# Patient Record
(demographics unavailable — no encounter records)

---

## 2018-01-07 NOTE — PHYS DOC
General


Chief Complaint:  URINARY RETENTION


Stated Complaint:  UNABLE TO URINATE


Time Seen by MD:  15:57


Source:  patient


Exam Limitations:  no limitations


Problems:  





History of Present Illness


Initial Comments


Patient is a 67-year-old male who comes the ED complaining of urinary retention.


Patient states he has history of kidney stones and "bad right kidney." He 

states that for the past 3 days she's had increasing difficulty producing a 

urine stream and that today he's had trouble producing anything but just a few 

drops since earlier this morning. He has been extensively evaluated in the past 

and states that he's been told he has blockages in his right ureter which were 

inoperable and that he had 95% deficit associated with his right kidney. On 

arrival he is very uncomfortable complaining of suprapubic and penile pain 

described as sharp and throbbing worse with attempts to urinate better with 

rest. He denies fever chills sweats or body aches no nausea vomiting or 

diarrhea.


Timing/Duration:  other


Severity:  severe


Modifying Factors:  improves with other


Associated Symptoms:  other


Allergies:  


Coded Allergies:  


     No Known Drug Allergies (Unverified , 16)





Past Medical History


Medical History:  other (arthritis, cancer, diabetes, kidney stones, ureteral 

stricture)


Surgical History:  appendectomy, cholecystectomy





Social History


Smoker:  cigarettes


Alcohol:  none


Drugs:  none





Review of Systems


Constitutional:  denies chills, denies fever, denies malaise


Respiratory:  denies cough, denies shortness of breath


Cardiovascular:  denies chest pain, denies palpitations


Gastrointestinal:  denies abdominal pain, denies diarrhea, denies nausea, 

denies vomiting


Genitourinary:  see HPI


Musculoskeletal:  denies back pain, denies joint swelling, denies neck pain


Psychiatric/Neurological:  denies headache, denies numbness, denies paresthesia





Physical Exam


General Appearance:  WD/WN, moderate distress


Ear, Nose, Throat:  hearing grossly normal, normal ENT inspection


Neck:  non-tender, supple


Respiratory:  normal breath sounds, no respiratory distress


Cardiovascular:  normal peripheral pulses, regular rate, rhythm


Gastrointestinal:  normal bowel sounds, soft (initially suprapubic tenderness 

with fullness), no organomegaly


Rectal:  deferred


Back:  no CVA tenderness, no vertebral tenderness


Extremities:  non-tender, normal inspection


Neurologic/Psychiatric:  CNs II-XII nml as tested, no motor/sensory deficits, 

alert, normal mood/affect





Orders, Labs, Meds


400 mL urine output after RN placed a Gaxiola


Labs reassuring, creatinine 1.4. Patient continues with waves of discomfort, CT 

indicated and ordered.














PATIENT: MAKAYLA DEL ROSARIO ACCOUNT: [id number] MRN#: [id number]


: 1950 LOCATION: ER AGE: 67


SEX: M EXAM DT: 18 ACCESSION#: [id number]


STATUS: REG ER ORD. PHYSICIAN: LUCINDA PEREZ DO 


REASON: urinary retention, groin/penile pain, h/o stones


PROCEDURE: CT ABDOMEN PELVIS WO CONTRAST





CT abdomen and pelvis without contrast


 


Indication:Urinary retention, severe penis and bladder pain x several 


days.  rt testicle cancer with radiation treatment,.


 


Technique: Contiguous axial images are obtained through the abdomen and 


pelvis. No intravenous or oral contrast per request. Multiplanar 


reformatted images obtained.


 


Exposure: One or more of the following individualized dose reduction 


techniques were utilized for this examination:  1. Automated exposure 


control  2. Adjustment of the mA and/or kV according to patient size  3. 


Use of iterative reconstruction technique.


 


Comparison: May 13, 2016.


 


Findings:


Urinary tracts: Severe right hydronephrosis and right hydroureter is 


identified. This was severe on the prior study, but has still 


substantially progressed since that last study. Severe renal cortical 


atrophy. The right ureter gradually tapers distally to the ureterovesical 


junction. No evidence of obstructive calculus.


 


Minimal left nephrolithiasis. No evidence of left hydronephroureter or 


ureteric calculus.


 


Evaluation of solid viscera, bowel and vasculature is compromised by the 


noncontrast technique.   


 


Lower thorax: Mild atelectasis or scarring in the right lung base.


Pneumoperitoneum:No gross pneumoperitoneum.


Liver: Unremarkable


Spleen: Unremarkable


Pancreas: Unremarkable


Kidneys: Small hypodense lesions of the left kidney compatible with cysts,


are similar to prior. There may be some right renal cysts, but these are 


difficult to distinguish from the severely dilated collecting system.


Adrenals: The left adrenal again demonstrates a somewhat thickened 


morphology, appears unchanged.


 


Gallbladder: Surgically absent.


Aorta: Abdominal aorta is nonaneurysmal


Lymph nodes: Small mildly enlarged aortocaval lymph nodes are identified, 


measuring up to 8 mm short axis.


 


GI tract: Mild diverticulosis. No evidence of pericolonic inflammatory 


changes. No evidence of obstruction.


Appendix: Not clearly visualized.


 


Ascites: No gross ascites.


Urinary bladder: Collapsed around a Gaxiola catheter.


No evidence of pelvic mass.


 


Bones: Severe degenerative changes. Appearance and alignment are stable 


since prior study. Mild retrolisthesis of L4 on L5 and L3 on L4.


 


IMPRESSION:


 


1. Severe right hydronephrosis and ureteric dilatation, has progressed 


since the prior study. Ureter tapers toward the ureterovesical junction, 


could indicate a stricture. No evidence of obstructive calculus. Severe 


renal cortical atrophy.


2. Mild retroperitoneal lymph node enlargement appears similar to prior 


study.


 


Electronically signed by: Venu Nevarez MD (2018 5:19 PM) [facility]














DICTATED AND SIGNED BY:     VENU NEVAREZ MD


DATE:     18 [id number]





CC: WEN HOLLOWAY MD; LUCINDA PEREZ DO ~

















I discussed findings with the patient and his spouse at length. I discussed and 

offered the patient transfer to [facility] for urologic 

evaluation. The patient declines he feels that no emergent situation is present 

that he has had these symptoms in the past and wants to go home. He wants to 

try and see if he can urinate now that he's had a Gaxiola catheter he refuses 

medications as he states he has Flomax pain medications etc. at home. He agrees 

to return to [facility] emergency department should symptoms 

recur as he wants to go wherever urology is in house. Signs and symptoms to 

monitor as well as indications for urgent return to the department were 

discussed in their questions were answered. The patient is persistent and his 

declination of admission or transfer and although I feel he would be better 

served as an inpatient he is alert and oriented and is not altered on any 

substance, his wishes will be honored he will be discharged home. He requests 

the Gaxiola be DC'd.


Departure


Time of Disposition:  17:41


Disposition:  01 HOME, SELF-CARE


Diagnosis:  urinary retention, right ureteral stricture


Condition:  STABLE


Patient Instructions:  Urinary Retention, Acute, Male, Easy-to-Read





Additional Instructions:  


As discussed you have declined transfer to a facility with in-house urology and 

have requested discharge home.


Continue your current medications and including Flomax and pain medications as 

needed.


Per your request: 


[facility], 


[address] [place] [address] [id number] [phone]


Call tomorrow to schedule next available appointment for further evaluation and 

treatment.


Return to the ED at any time should you change your mind or with new or 

progressive symptoms.











LUCINDA PEREZ DO 2018 17:27

## 2018-01-07 NOTE — RAD
CT abdomen and pelvis without contrast

 

Indication:Urinary retention, severe penis and bladder pain x several 

days. 1995 rt testicle cancer with radiation treatment,.

 

Technique: Contiguous axial images are obtained through the abdomen and 

pelvis. No intravenous or oral contrast per request. Multiplanar 

reformatted images obtained.

 

Exposure: One or more of the following individualized dose reduction 

techniques were utilized for this examination:  1. Automated exposure 

control  2. Adjustment of the mA and/or kV according to patient size  3. 

Use of iterative reconstruction technique.

 

Comparison: May 13, 2016.

 

Findings:

Urinary tracts: Severe right hydronephrosis and right hydroureter is 

identified. This was severe on the prior study, but has still 

substantially progressed since that last study. Severe renal cortical 

atrophy. The right ureter gradually tapers distally to the ureterovesical 

junction. No evidence of obstructive calculus.

 

Minimal left nephrolithiasis. No evidence of left hydronephroureter or 

ureteric calculus.

 

Evaluation of solid viscera, bowel and vasculature is compromised by the 

noncontrast technique.   

 

Lower thorax: Mild atelectasis or scarring in the right lung base.

Pneumoperitoneum:No gross pneumoperitoneum.

Liver: Unremarkable

Spleen: Unremarkable

Pancreas: Unremarkable

Kidneys: Small hypodense lesions of the left kidney compatible with cysts,

are similar to prior. There may be some right renal cysts, but these are 

difficult to distinguish from the severely dilated collecting system.

Adrenals: The left adrenal again demonstrates a somewhat thickened 

morphology, appears unchanged.

 

Gallbladder: Surgically absent.

Aorta: Abdominal aorta is nonaneurysmal

Lymph nodes: Small mildly enlarged aortocaval lymph nodes are identified, 

measuring up to 8 mm short axis.

 

GI tract: Mild diverticulosis. No evidence of pericolonic inflammatory 

changes. No evidence of obstruction.

Appendix: Not clearly visualized.

 

Ascites: No gross ascites.

Urinary bladder: Collapsed around a Gaxiola catheter.

No evidence of pelvic mass.

 

Bones: Severe degenerative changes. Appearance and alignment are stable 

since prior study. Mild retrolisthesis of L4 on L5 and L3 on L4.

 

IMPRESSION:

 

1. Severe right hydronephrosis and ureteric dilatation, has progressed 

since the prior study. Ureter tapers toward the ureterovesical junction, 

could indicate a stricture. No evidence of obstructive calculus. Severe 

renal cortical atrophy.

2. Mild retroperitoneal lymph node enlargement appears similar to prior 

study.

 

Electronically signed by: Venu Nevarez MD (1/7/2018 5:19 PM) [facility]